# Patient Record
Sex: MALE | Race: WHITE | HISPANIC OR LATINO | Employment: FULL TIME | URBAN - METROPOLITAN AREA
[De-identification: names, ages, dates, MRNs, and addresses within clinical notes are randomized per-mention and may not be internally consistent; named-entity substitution may affect disease eponyms.]

---

## 2021-04-08 ENCOUNTER — OFFICE VISIT (OUTPATIENT)
Dept: OBGYN CLINIC | Facility: OTHER | Age: 56
End: 2021-04-08
Payer: COMMERCIAL

## 2021-04-08 VITALS
BODY MASS INDEX: 29.26 KG/M2 | WEIGHT: 216 LBS | HEART RATE: 85 BPM | HEIGHT: 72 IN | SYSTOLIC BLOOD PRESSURE: 144 MMHG | DIASTOLIC BLOOD PRESSURE: 94 MMHG

## 2021-04-08 DIAGNOSIS — M19.011 PRIMARY OSTEOARTHRITIS OF RIGHT SHOULDER: ICD-10-CM

## 2021-04-08 DIAGNOSIS — M75.111 NONTRAUMATIC INCOMPLETE TEAR OF RIGHT ROTATOR CUFF: Primary | ICD-10-CM

## 2021-04-08 PROCEDURE — 20610 DRAIN/INJ JOINT/BURSA W/O US: CPT | Performed by: ORTHOPAEDIC SURGERY

## 2021-04-08 PROCEDURE — 99203 OFFICE O/P NEW LOW 30 MIN: CPT | Performed by: ORTHOPAEDIC SURGERY

## 2021-04-08 RX ORDER — BETAMETHASONE SODIUM PHOSPHATE AND BETAMETHASONE ACETATE 3; 3 MG/ML; MG/ML
6 INJECTION, SUSPENSION INTRA-ARTICULAR; INTRALESIONAL; INTRAMUSCULAR; SOFT TISSUE
Status: COMPLETED | OUTPATIENT
Start: 2021-04-08 | End: 2021-04-08

## 2021-04-08 RX ORDER — FINASTERIDE 1 MG/1
1 TABLET, FILM COATED ORAL DAILY
COMMUNITY

## 2021-04-08 RX ORDER — BUPIVACAINE HYDROCHLORIDE 2.5 MG/ML
2 INJECTION, SOLUTION INFILTRATION; PERINEURAL
Status: COMPLETED | OUTPATIENT
Start: 2021-04-08 | End: 2021-04-08

## 2021-04-08 RX ADMIN — BUPIVACAINE HYDROCHLORIDE 2 ML: 2.5 INJECTION, SOLUTION INFILTRATION; PERINEURAL at 11:29

## 2021-04-08 RX ADMIN — BETAMETHASONE SODIUM PHOSPHATE AND BETAMETHASONE ACETATE 6 MG: 3; 3 INJECTION, SUSPENSION INTRA-ARTICULAR; INTRALESIONAL; INTRAMUSCULAR; SOFT TISSUE at 11:29

## 2021-04-08 NOTE — PROGRESS NOTES
Assessment  Diagnoses and all orders for this visit:    Nontraumatic partial thickness tear of right rotator cuff    Primary osteoarthritis of right shoulder    Discussion and Plan:    · Explained to the patient that he MRI reveals a partial thickness rotator cuff tear with some glenohumeral joint degenerative changes  A CS injection was provided for the patient today  This is documented appropriately below  · He will also be referred to formal physical therapy/HEP  · Activities as tolerated  Avoid painful maneuvers  · If symptoms persist could consider an arthroscopic procedure to further evaluate the rotator cuff  He understood and all questions were answered  · Follow up on an as needed basis    Subjective:   Patient ID: Taurus Ramierz is a 50 y o  male      The patient presents with a chief complaint of right shoulder pain  The pain began several year(s) ago and is not associated with an acute injury  The patient describes the pain as aching and dull in intensity,  intermittent, occurring with increasing frequency in timing, and localizes the pain to the  right glenohumeral joint, deltoid  The pain is worse with overhead work, overuse and raising arm over head and relieved by rest, ice, avoiding the painful activities  The pain is not associated with numbness and tingling  The pain is not associated with constitutional symptoms  The patient is not awoken at night by the pain  The patient has had prior treatment in the form of chiropractic treatment with minimal benefit  The following portions of the patient's history were reviewed and updated as appropriate: allergies, current medications, past family history, past medical history, past social history, past surgical history and problem list     Review of Systems   Constitutional: Negative for chills, fatigue, fever and unexpected weight change  HENT: Negative for hearing loss, nosebleeds and sore throat      Eyes: Negative for pain, redness and visual disturbance  Respiratory: Negative for cough, shortness of breath and wheezing  Cardiovascular: Negative for chest pain, palpitations and leg swelling  Gastrointestinal: Negative for abdominal pain, nausea and vomiting  Endocrine: Negative for polydipsia and polyuria  Genitourinary: Negative for frequency and urgency  Skin: Negative for color change, rash and wound  Neurological: Negative for dizziness, weakness, numbness and headaches  Psychiatric/Behavioral: Negative for behavioral problems, self-injury and suicidal ideas  Objective:  /94   Pulse 85   Ht 6' (1 829 m)   Wt 98 kg (216 lb)   BMI 29 29 kg/m²       Right Shoulder Exam     Tenderness   The patient is experiencing no tenderness  Range of Motion   The patient has normal right shoulder ROM  Muscle Strength   Abduction: 5/5   External rotation: 5/5     Tests   Mcclain test: positive  Impingement: positive  Drop arm: negative    Other   Erythema: absent  Sensation: normal  Pulse: present              Physical Exam  Constitutional:       General: He is not in acute distress  Appearance: He is well-developed  Eyes:      Conjunctiva/sclera: Conjunctivae normal       Pupils: Pupils are equal, round, and reactive to light  Neck:      Musculoskeletal: Normal range of motion and neck supple  Cardiovascular:      Rate and Rhythm: Normal rate and regular rhythm  Pulmonary:      Effort: Pulmonary effort is normal       Breath sounds: Normal breath sounds  Abdominal:      General: Bowel sounds are normal       Palpations: Abdomen is soft  Skin:     General: Skin is warm and dry  Findings: No erythema or rash  Neurological:      Mental Status: He is alert and oriented to person, place, and time  Deep Tendon Reflexes: Reflexes are normal and symmetric     Psychiatric:         Behavior: Behavior normal        Large joint arthrocentesis: R glenohumeral  Justice Protocol:  Consent: Verbal consent obtained  Risks and benefits: risks, benefits and alternatives were discussed  Consent given by: patient  Time out: Immediately prior to procedure a "time out" was called to verify the correct patient, procedure, equipment, support staff and site/side marked as required  Site marked: the operative site was marked  Supporting Documentation  Indications: pain and diagnostic evaluation   Procedure Details  Location: shoulder - R glenohumeral  Preparation: Patient was prepped and draped in the usual sterile fashion  Needle size: 22 G  Ultrasound guidance: no  Approach: posterior  Medications administered: 2 mL bupivacaine 0 25 %; 6 mg betamethasone acetate-betamethasone sodium phosphate 6 (3-3) mg/mL    Patient tolerance: patient tolerated the procedure well with no immediate complications  Dressing:  Sterile dressing applied          I have personally reviewed pertinent films in PACS and my interpretation is as follows  MRI Right Shoulder 3/12/2021: Mild supraspinatus tendinosis with articular surface partial tear of the tendon  No full thickness rotator cuff tear  Mild glenohumeral joint osteoarthritis       Scribe Attestation    I,:  Starr Lawrence am acting as a scribe while in the presence of the attending physician :       I,:  Washington Miner MD personally performed the services described in this documentation    as scribed in my presence :

## 2021-04-08 NOTE — PATIENT INSTRUCTIONS

## 2021-04-12 NOTE — PROGRESS NOTES
PT Evaluation     Today's date: 2021  Patient name: Erna Velásquez  : 1965  MRN: [id number]  Referring provider: Patricia Diez MD  Dx:   Encounter Diagnosis     ICD-10-CM    1  Nontraumatic incomplete tear of right rotator cuff  M75 111    2  Primary osteoarthritis of right shoulder  M19 011                   Assessment  Assessment details: The patient is a 49 y/o male who presents to PT with diagnosis of incomplete tear of R RTC and OA of R shoulder  He has complaints of intermittent pain with most pain being in the joint  He also demonstrates deficits with slight decreased ROM and strength, decreased flexibility, decreased postural awareness and pain with completing his ADLs and recreational activities  He remains I with all his ADLs though he has pain with completing them  Increased pain noted with movement of his arm and pain at end range for shoulder flexion and abduction  He does yoga for recreation and has pain with certain yoga positions  Pain is not long lasting, typically will subside when out of that position  No TTP is noted along his shoulder  The patient would benefit from continued PT to address deficits and improve function  Tx to include ROM, stretching, strengthening, modalities, HEP, pt education, postural ed, lifting/body mechanics, neuro re-ed, balance/proprioception Te, MT and equipment  Impairments: abnormal or restricted ROM, activity intolerance, impaired physical strength, lacks appropriate home exercise program, pain with function, poor posture  and poor body mechanics  Other impairment: decreased flexibility  Understanding of Dx/Px/POC: good   Prognosis: good    Goals  STGs:  1  Initiate and complete HEP with verbal cues  2   Decrease R shoulder pain by 25% in 4 weeks  3   Improve R shoulder strength by 1/2 grade in 4 weeks  4   Improve R shoulder ROM by 5-10 degrees in 4 weeks  LTGs:  1  Patient to be I with HEP in 12 weeks    2   Improve R shoulder ROM to WNL t/o in 8 weeks to improve function  3   Improve RUE strength to 5/5 t/o in 8 weeks to improve function  4   Decrease R shoulder pain to < or = to 1-2/10 with activity in 8 weeks to improve function  5   Recreational performance in related activities is improved to PLOF in 8 weeks  6   Postural control is improved to maximal level of function in 8 weeks  7   Work performance is improved to PLOF in 8 weeks  8   ADL performance is improved to PLOF in 8 weeks  Plan  Plan details: Modalities and therapy interventions prn  Patient would benefit from: skilled physical therapy  Planned modality interventions: cryotherapy and thermotherapy: hydrocollator packs  Planned therapy interventions: manual therapy, behavior modification, body mechanics training, neuromuscular re-education, patient education, postural training, self care, strengthening, stretching, therapeutic activities, therapeutic exercise, flexibility, home exercise program and joint mobilization  Frequency: 1x week  Duration in weeks: 8  Plan of Care beginning date: 2021  Plan of Care expiration date: 2021  Treatment plan discussed with: patient        Subjective Evaluation    History of Present Illness  Mechanism of injury: The patient states that he developed R shoulder pain about three years ago with no known cause  When it first started he did not get treatment, pain has been getting worse  He had gone to see a chiropractor without relief  He was prescribed an MRI, which the patient had completed  He had then gone to see the orthopedic doctor  He had gone to see Dr Sunni Godinez on 21  He did have an injection in his shoulder which did give him some relief  He was also referred to OPPT and he now presents for his evaluation  He does not have an appointment to go back to see the doctor for a follow up appointment, to see him on an as needed basis      Quality of life: good    Pain  At best pain ratin  At worst pain ratin  Location: R Shoulder - in joint  Denies any radiating arm pain, denies N&T  Quality: discomfort and dull ache  Aggravating factors: lifting    Social Support    Employment status: working (Works for [employer] )  Hand dominance: left      Diagnostic Tests  MRI studies: abnormal  Treatments  Previous treatment: injection treatment  Patient Goals  Patient goals for therapy: increased motion, decreased pain and increased strength  Patient goal: "To be pain free "          Objective     Static Posture     Shoulders  Rounded  Postural Observations  Seated posture: fair        Neurological Testing     Sensation     Shoulder   Left Shoulder   Intact: light touch    Right Shoulder   Intact: light touch    Active Range of Motion   Left Shoulder   Flexion: 180 degrees   Abduction: 180 degrees   External rotation 90°: 90 degrees   Internal rotation 90°: 65 degrees     Right Shoulder   Flexion: 170 degrees with pain  Abduction: 148 degrees with pain  External rotation 90°: 78 degreeswith pain  Internal rotation 90°: 62 degrees with pain    Left Elbow   Normal active range of motion    Right Elbow   Normal active range of motion    Strength/Myotome Testing     Left Shoulder   Normal muscle strength    Right Shoulder     Planes of Motion   Flexion: 4+   Abduction: 4   External rotation at 90°: 4   Internal rotation at 90°: 4+     Left Elbow   Normal strength    Right Elbow   Normal strength    Tests     Right Shoulder   Positive empty can  Negative drop arm       Ambulation     Ambulation: Level Surfaces   Ambulation without assistive device: independent             Precautions: None      Manuals 21       R Shoulder all planes                                Neuro Re-Ed        MTP/LTP        Ball Roll on Wall        PNF                                         Ther Ex        UBE Alt        Shrugs/Rolls        Retractions        Isometrics HEP       Pulleys        Stand FF & Abd        TBand IR/ER HEP [place] ER w/TBand HEP       Supine Punch        Cane TE Flex/Abd/ER        S/L ER & Abd HEP       Prone Flex/Ext/Rows        Doorway Stretch        Posterior Capsule Stretch        Reviewed HEP ML               Ther Activity                        Gait Training                        Modalities        CP prn                    Issued and reviewed HEP for shoulder isometrics for flex/ext/IR/ER, TBand IR/ER, [place] ER w/TBand and S/L ER  He verbalized understanding  Also spoke with patient about shoulder anatomy and pathology

## 2021-04-20 ENCOUNTER — EVALUATION (OUTPATIENT)
Dept: PHYSICAL THERAPY | Facility: CLINIC | Age: 56
End: 2021-04-20
Payer: COMMERCIAL

## 2021-04-20 DIAGNOSIS — M75.111 NONTRAUMATIC INCOMPLETE TEAR OF RIGHT ROTATOR CUFF: Primary | ICD-10-CM

## 2021-04-20 DIAGNOSIS — M75.111 NONTRAUMATIC INCOMPLETE TEAR OF RIGHT ROTATOR CUFF: ICD-10-CM

## 2021-04-20 DIAGNOSIS — M19.011 PRIMARY OSTEOARTHRITIS OF RIGHT SHOULDER: ICD-10-CM

## 2021-04-20 PROCEDURE — 97110 THERAPEUTIC EXERCISES: CPT | Performed by: PHYSICAL THERAPIST

## 2021-04-20 PROCEDURE — 97161 PT EVAL LOW COMPLEX 20 MIN: CPT | Performed by: PHYSICAL THERAPIST

## 2021-05-04 ENCOUNTER — OFFICE VISIT (OUTPATIENT)
Dept: PHYSICAL THERAPY | Facility: CLINIC | Age: 56
End: 2021-05-04
Payer: COMMERCIAL

## 2021-05-04 DIAGNOSIS — M19.011 PRIMARY OSTEOARTHRITIS OF RIGHT SHOULDER: ICD-10-CM

## 2021-05-04 DIAGNOSIS — M75.111 NONTRAUMATIC INCOMPLETE TEAR OF RIGHT ROTATOR CUFF: Primary | ICD-10-CM

## 2021-05-04 PROCEDURE — 97110 THERAPEUTIC EXERCISES: CPT | Performed by: PHYSICAL THERAPIST

## 2021-05-04 NOTE — PROGRESS NOTES
Daily Note     Today's date: 2021  Patient name: Victor M Oh  : 1965  MRN: [id number]  Referring provider: Yogesh Sullivan MD  Dx:   Encounter Diagnosis     ICD-10-CM    1  Nontraumatic incomplete tear of right rotator cuff  M75 111    2  Primary osteoarthritis of right shoulder  M19 011                   Subjective: The patient states that he has been doing good with his HEP over the last two weeks  He feels that he has less pain since the injection in his shoulder  Increased pain with pushups from down to up position  He has been able to do yoga also  Objective: See treatment diary below  Assessment: Progressed patient with HEP as outlined below  He had good understanding of progression with TE for at home for strengthening  No increased pain during session today  Plan: Continue per plan of care  He is going to call the doctor office about getting another injection in his shoulder and will phone clinic after that if he feels like he needs to return to PT  Hold PT at this time and continue with his HEP        Precautions: None      Manuals 21      R Shoulder all planes                                Neuro Re-Ed        MTP/LTP  Blue 10x each      Ball Roll on Wall        PNF                                         Ther Ex        UBE Alt        Shrugs/Rolls        Retractions        Isometrics HEP       Pulleys        Stand FF & Abd        TBand IR/ER HEP       [place] ER w/TBand HEP       Supine Punch  10x       Prone T & Ys  10x each       S/L ER & Abd HEP Abd & Flex 10x each      Prone Flex/Ext/Rows  Flex/Ext/Horiz Abd 10x each      Doorway Stretch        Posterior Capsule Stretch        Reviewed HEP ML ML      Wall Pushups  HEP      Ther Activity                        Gait Training                        Modalities        CP prn

## 2021-05-19 NOTE — TELEPHONE ENCOUNTER
Spoke to patient  He denies redness, heat to touch, or swelling of the shoulder  Scheduled for follow up at his earliest convenience   Thank you

## 2021-06-10 ENCOUNTER — OFFICE VISIT (OUTPATIENT)
Dept: OBGYN CLINIC | Facility: OTHER | Age: 56
End: 2021-06-10
Payer: COMMERCIAL

## 2021-06-10 VITALS
BODY MASS INDEX: 27.63 KG/M2 | SYSTOLIC BLOOD PRESSURE: 132 MMHG | HEIGHT: 72 IN | WEIGHT: 204 LBS | HEART RATE: 74 BPM | DIASTOLIC BLOOD PRESSURE: 81 MMHG

## 2021-06-10 DIAGNOSIS — M75.111 NONTRAUMATIC INCOMPLETE TEAR OF RIGHT ROTATOR CUFF: Primary | ICD-10-CM

## 2021-06-10 DIAGNOSIS — M19.011 PRIMARY OSTEOARTHRITIS OF RIGHT SHOULDER: ICD-10-CM

## 2021-06-10 PROCEDURE — 99213 OFFICE O/P EST LOW 20 MIN: CPT | Performed by: ORTHOPAEDIC SURGERY

## 2021-06-10 PROCEDURE — 20610 DRAIN/INJ JOINT/BURSA W/O US: CPT | Performed by: ORTHOPAEDIC SURGERY

## 2021-06-10 RX ORDER — BETAMETHASONE SODIUM PHOSPHATE AND BETAMETHASONE ACETATE 3; 3 MG/ML; MG/ML
6 INJECTION, SUSPENSION INTRA-ARTICULAR; INTRALESIONAL; INTRAMUSCULAR; SOFT TISSUE
Status: COMPLETED | OUTPATIENT
Start: 2021-06-10 | End: 2021-06-10

## 2021-06-10 RX ORDER — BUPIVACAINE HYDROCHLORIDE 2.5 MG/ML
2 INJECTION, SOLUTION INFILTRATION; PERINEURAL
Status: COMPLETED | OUTPATIENT
Start: 2021-06-10 | End: 2021-06-10

## 2021-06-10 RX ADMIN — BETAMETHASONE SODIUM PHOSPHATE AND BETAMETHASONE ACETATE 6 MG: 3; 3 INJECTION, SUSPENSION INTRA-ARTICULAR; INTRALESIONAL; INTRAMUSCULAR; SOFT TISSUE at 10:49

## 2021-06-10 RX ADMIN — BUPIVACAINE HYDROCHLORIDE 2 ML: 2.5 INJECTION, SOLUTION INFILTRATION; PERINEURAL at 10:49

## 2021-06-10 NOTE — PATIENT INSTRUCTIONS

## 2021-06-10 NOTE — PROGRESS NOTES
Assessment  Diagnoses and all orders for this visit:    Nontraumatic partial thickness tear of right rotator cuff    Primary osteoarthritis of right shoulder        Discussion and Plan:    · Patient has a partial thickness rotator cuff tear with mild glenohumeral joint osteoarthritis  Improvements with glenohumeral injection on 4/8/2021 and with PT/HEP  · CS injection was performed today into the SA bursa for his intermittent symptoms  This is documented approprietly below  · Continue to perform HEP and other activities as tolerated  Avoid painful maneuvers  · If symptoms persist could consider an arthroscopic procedure to further evaluate his rotator cuff  · Follow up on an as needed basis    Subjective:   Patient ID: Erna Velásquez is a 50 y o  male      HPI  51 y/o male who presents today for a follow up visit for his right shoulder  Patient reports that his symptoms were improved by a CS injection on 4/8/2021 and he has been performing HEP  Today, he reports mild, intermittent "soreness" about the shoulder as he has increased his activities since his last visit  He is localizing his symptoms about the anterolateral aspect of the shoulder  No numbness or tingling  No fevers chills  The following portions of the patient's history were reviewed and updated as appropriate: allergies, current medications, past family history, past medical history, past social history, past surgical history and problem list     Review of Systems   Constitutional: Negative for chills, fatigue, fever and unexpected weight change  HENT: Negative for hearing loss, nosebleeds and sore throat  Eyes: Negative for pain, redness and visual disturbance  Respiratory: Negative for cough, shortness of breath and wheezing  Cardiovascular: Negative for chest pain, palpitations and leg swelling  Gastrointestinal: Negative for abdominal pain, nausea and vomiting  Endocrine: Negative for polydipsia and polyuria  Genitourinary: Negative for frequency and urgency  Skin: Negative for color change, rash and wound  Neurological: Negative for dizziness, weakness, numbness and headaches  Psychiatric/Behavioral: Negative for behavioral problems, self-injury and suicidal ideas  Objective:  /81   Pulse 74   Ht 6' (1 829 m)   Wt 92 5 kg (204 lb)   BMI 27 67 kg/m²       Right Shoulder Exam     Tenderness   The patient is experiencing no tenderness  Range of Motion   The patient has normal right shoulder ROM  Muscle Strength   Abduction: 5/5     Tests   Mcclain test: positive  Drop arm: negative    Other   Erythema: absent  Sensation: normal  Pulse: present            Physical Exam  Constitutional:       General: He is not in acute distress  Appearance: He is well-developed  Eyes:      Conjunctiva/sclera: Conjunctivae normal       Pupils: Pupils are equal, round, and reactive to light  Neck:      Musculoskeletal: Normal range of motion and neck supple  Cardiovascular:      Rate and Rhythm: Normal rate and regular rhythm  Pulmonary:      Effort: Pulmonary effort is normal       Breath sounds: Normal breath sounds  Abdominal:      General: Bowel sounds are normal       Palpations: Abdomen is soft  Skin:     General: Skin is warm and dry  Findings: No erythema or rash  Neurological:      Mental Status: He is alert and oriented to person, place, and time  Deep Tendon Reflexes: Reflexes are normal and symmetric  Psychiatric:         Behavior: Behavior normal      Large joint arthrocentesis: R subacromial bursa  Universal Protocol:  Consent given by: patient  Time out: Immediately prior to procedure a "time out" was called to verify the correct patient, procedure, equipment, support staff and site/side marked as required    Site marked: the operative site was marked  Supporting Documentation  Indications: pain and diagnostic evaluation   Procedure Details  Location: shoulder - R subacromial bursa  Preparation: Patient was prepped and draped in the usual sterile fashion  Needle size: 22 G  Ultrasound guidance: no  Approach: lateral  Medications administered: 2 mL bupivacaine 0 25 %; 6 mg betamethasone acetate-betamethasone sodium phosphate 6 (3-3) mg/mL    Patient tolerance: patient tolerated the procedure well with no immediate complications  Dressing:  Sterile dressing applied          Scribe Attestation    I,:  Bony Sigala am acting as a scribe while in the presence of the attending physician :       I,:  Harlene Kanner, MD personally performed the services described in this documentation    as scribed in my presence :

## 2022-01-10 ENCOUNTER — OFFICE VISIT (OUTPATIENT)
Dept: OBGYN CLINIC | Facility: OTHER | Age: 57
End: 2022-01-10
Payer: COMMERCIAL

## 2022-01-10 VITALS
DIASTOLIC BLOOD PRESSURE: 75 MMHG | HEIGHT: 72 IN | SYSTOLIC BLOOD PRESSURE: 126 MMHG | HEART RATE: 83 BPM | WEIGHT: 212 LBS | BODY MASS INDEX: 28.71 KG/M2

## 2022-01-10 DIAGNOSIS — M75.111 NONTRAUMATIC INCOMPLETE TEAR OF RIGHT ROTATOR CUFF: Primary | ICD-10-CM

## 2022-01-10 DIAGNOSIS — M19.011 PRIMARY OSTEOARTHRITIS OF RIGHT SHOULDER: ICD-10-CM

## 2022-01-10 PROCEDURE — 20610 DRAIN/INJ JOINT/BURSA W/O US: CPT | Performed by: ORTHOPAEDIC SURGERY

## 2022-01-10 PROCEDURE — 99213 OFFICE O/P EST LOW 20 MIN: CPT | Performed by: ORTHOPAEDIC SURGERY

## 2022-01-10 RX ORDER — BETAMETHASONE SODIUM PHOSPHATE AND BETAMETHASONE ACETATE 3; 3 MG/ML; MG/ML
6 INJECTION, SUSPENSION INTRA-ARTICULAR; INTRALESIONAL; INTRAMUSCULAR; SOFT TISSUE
Status: COMPLETED | OUTPATIENT
Start: 2022-01-10 | End: 2022-01-10

## 2022-01-10 RX ORDER — BUPIVACAINE HYDROCHLORIDE 2.5 MG/ML
2 INJECTION, SOLUTION INFILTRATION; PERINEURAL
Status: COMPLETED | OUTPATIENT
Start: 2022-01-10 | End: 2022-01-10

## 2022-01-10 RX ADMIN — BETAMETHASONE SODIUM PHOSPHATE AND BETAMETHASONE ACETATE 6 MG: 3; 3 INJECTION, SUSPENSION INTRA-ARTICULAR; INTRALESIONAL; INTRAMUSCULAR; SOFT TISSUE at 14:34

## 2022-01-10 RX ADMIN — BUPIVACAINE HYDROCHLORIDE 2 ML: 2.5 INJECTION, SOLUTION INFILTRATION; PERINEURAL at 14:34

## 2022-01-10 NOTE — PROGRESS NOTES
Assessment  Diagnoses and all orders for this visit:    Nontraumatic partial thickness tear of right rotator cuff    Primary osteoarthritis of right shoulder    Discussion and Plan:    · Patient has a partial thickness rotator cuff tear with mild glenohumeral joint osteoarthritis  Significant improvements with subacromial injection on 6/10/2021 and with PT/HEP  · Patient was provided with a repeat subacromial injection today in the office  This is documented appropriately below  · Continue to perform HEP and other activities as tolerated  Avoid painful maneuvers  · If symptoms persist could consider an arthroscopic procedure to further evaluate his rotator cuff  · Follow up on an as needed basis    Subjective:   Patient ID: Negrito Elliott is a 49 y o  male      HPI  50 y/o male who presents today for a follow up visit for his right shoulder  Patient reports that his symptoms were significantly improved for many months after his last subacromial injection on 6/10/2021  Today, he is noting mildly increasing symptoms about his shoulder over the past few months  He is requesting to repeat an injection today  No numbness or tingling  No fevers or chills  The following portions of the patient's history were reviewed and updated as appropriate: allergies, current medications, past family history, past medical history, past social history, past surgical history and problem list     Review of Systems   Constitutional: Negative for chills, fatigue, fever and unexpected weight change  HENT: Negative for hearing loss, nosebleeds and sore throat  Eyes: Negative for pain, redness and visual disturbance  Respiratory: Negative for cough, shortness of breath and wheezing  Cardiovascular: Negative for chest pain, palpitations and leg swelling  Gastrointestinal: Negative for abdominal pain, nausea and vomiting  Endocrine: Negative for polydipsia and polyuria     Genitourinary: Negative for frequency and urgency  Skin: Negative for color change, rash and wound  Neurological: Negative for dizziness, weakness, numbness and headaches  Psychiatric/Behavioral: Negative for behavioral problems, self-injury and suicidal ideas  Objective:  /75   Pulse 83   Ht 6' (1 829 m)   Wt 96 2 kg (212 lb)   BMI 28 75 kg/m²       Right Shoulder Exam     Tenderness   The patient is experiencing no tenderness  Range of Motion   The patient has normal right shoulder ROM  Muscle Strength   Abduction: 5/5   External rotation: 5/5     Tests   Mcclain test: positive    Other   Erythema: absent  Sensation: normal  Pulse: present              Physical Exam  Constitutional:       General: He is not in acute distress  Appearance: He is well-developed  Eyes:      Conjunctiva/sclera: Conjunctivae normal       Pupils: Pupils are equal, round, and reactive to light  Cardiovascular:      Rate and Rhythm: Normal rate and regular rhythm  Pulmonary:      Effort: Pulmonary effort is normal       Breath sounds: Normal breath sounds  Abdominal:      General: Bowel sounds are normal       Palpations: Abdomen is soft  Musculoskeletal:      Cervical back: Normal range of motion and neck supple  Skin:     General: Skin is warm and dry  Findings: No erythema or rash  Neurological:      Mental Status: He is alert and oriented to person, place, and time  Deep Tendon Reflexes: Reflexes are normal and symmetric  Psychiatric:         Behavior: Behavior normal        Large joint arthrocentesis: R subacromial bursa  Universal Protocol:  Consent: Verbal consent obtained  Risks and benefits: risks, benefits and alternatives were discussed  Consent given by: patient  Time out: Immediately prior to procedure a "time out" was called to verify the correct patient, procedure, equipment, support staff and site/side marked as required    Site marked: the operative site was marked  Supporting Documentation  Indications: pain and diagnostic evaluation   Procedure Details  Location: shoulder - R subacromial bursa  Preparation: Patient was prepped and draped in the usual sterile fashion  Needle size: 22 G  Ultrasound guidance: no  Approach: lateral  Medications administered: 2 mL bupivacaine 0 25 %; 6 mg betamethasone acetate-betamethasone sodium phosphate 6 (3-3) mg/mL    Patient tolerance: patient tolerated the procedure well with no immediate complications  Dressing:  Sterile dressing applied        Scribe Attestation    I,:  Sung eMdeiros am acting as a scribe while in the presence of the attending physician :       I,:  Tatiana Wilde MD personally performed the services described in this documentation    as scribed in my presence :

## 2022-01-10 NOTE — PATIENT INSTRUCTIONS
